# Patient Record
Sex: FEMALE | Race: BLACK OR AFRICAN AMERICAN | NOT HISPANIC OR LATINO | URBAN - METROPOLITAN AREA
[De-identification: names, ages, dates, MRNs, and addresses within clinical notes are randomized per-mention and may not be internally consistent; named-entity substitution may affect disease eponyms.]

---

## 2023-08-18 LAB
CHLAMYDIA TRACH., AMPLIFIED: NEGATIVE
N. GONORRHEA, AMPLIFIED: NEGATIVE
TRICHOMONAS VAGINALIS: NEGATIVE

## 2024-04-10 ENCOUNTER — HOSPITAL ENCOUNTER (EMERGENCY)
Facility: HOSPITAL | Age: 24
Discharge: HOME | End: 2024-04-10
Attending: EMERGENCY MEDICINE
Payer: COMMERCIAL

## 2024-04-10 VITALS
SYSTOLIC BLOOD PRESSURE: 110 MMHG | TEMPERATURE: 97.9 F | WEIGHT: 121 LBS | HEIGHT: 61 IN | OXYGEN SATURATION: 100 % | BODY MASS INDEX: 22.84 KG/M2 | DIASTOLIC BLOOD PRESSURE: 75 MMHG | RESPIRATION RATE: 16 BRPM | HEART RATE: 74 BPM

## 2024-04-10 DIAGNOSIS — N39.0 UTI (URINARY TRACT INFECTION) WITH PYURIA: Primary | ICD-10-CM

## 2024-04-10 DIAGNOSIS — N30.90 CYSTITIS: ICD-10-CM

## 2024-04-10 LAB
ALBUMIN SERPL BCP-MCNC: 4.2 G/DL (ref 3.4–5)
ALP SERPL-CCNC: 57 U/L (ref 33–110)
ALT SERPL W P-5'-P-CCNC: 10 U/L (ref 7–45)
ANION GAP SERPL CALC-SCNC: 12 MMOL/L (ref 10–20)
APPEARANCE UR: ABNORMAL
AST SERPL W P-5'-P-CCNC: 18 U/L (ref 9–39)
BACTERIA #/AREA URNS AUTO: ABNORMAL /HPF
BASOPHILS # BLD AUTO: 0.02 X10*3/UL (ref 0–0.1)
BASOPHILS NFR BLD AUTO: 0.2 %
BILIRUB SERPL-MCNC: 0.4 MG/DL (ref 0–1.2)
BILIRUB UR STRIP.AUTO-MCNC: NEGATIVE MG/DL
BUN SERPL-MCNC: 11 MG/DL (ref 6–23)
CALCIUM SERPL-MCNC: 9.2 MG/DL (ref 8.6–10.3)
CHLORIDE SERPL-SCNC: 106 MMOL/L (ref 98–107)
CO2 SERPL-SCNC: 24 MMOL/L (ref 21–32)
COLOR UR: ABNORMAL
CREAT SERPL-MCNC: 0.77 MG/DL (ref 0.5–1.05)
EGFRCR SERPLBLD CKD-EPI 2021: >90 ML/MIN/1.73M*2
EOSINOPHIL # BLD AUTO: 0.07 X10*3/UL (ref 0–0.7)
EOSINOPHIL NFR BLD AUTO: 0.8 %
ERYTHROCYTE [DISTWIDTH] IN BLOOD BY AUTOMATED COUNT: 14.4 % (ref 11.5–14.5)
GLUCOSE SERPL-MCNC: 86 MG/DL (ref 74–99)
GLUCOSE UR STRIP.AUTO-MCNC: NORMAL MG/DL
HCG UR QL IA.RAPID: NEGATIVE
HCT VFR BLD AUTO: 39.1 % (ref 36–46)
HGB BLD-MCNC: 12.9 G/DL (ref 12–16)
IMM GRANULOCYTES # BLD AUTO: 0.03 X10*3/UL (ref 0–0.7)
IMM GRANULOCYTES NFR BLD AUTO: 0.3 % (ref 0–0.9)
KETONES UR STRIP.AUTO-MCNC: NEGATIVE MG/DL
LACTATE SERPL-SCNC: 0.5 MMOL/L (ref 0.4–2)
LEUKOCYTE ESTERASE UR QL STRIP.AUTO: ABNORMAL
LIPASE SERPL-CCNC: 48 U/L (ref 9–82)
LYMPHOCYTES # BLD AUTO: 2.45 X10*3/UL (ref 1.2–4.8)
LYMPHOCYTES NFR BLD AUTO: 26.3 %
MCH RBC QN AUTO: 29.3 PG (ref 26–34)
MCHC RBC AUTO-ENTMCNC: 33 G/DL (ref 32–36)
MCV RBC AUTO: 89 FL (ref 80–100)
MONOCYTES # BLD AUTO: 0.63 X10*3/UL (ref 0.1–1)
MONOCYTES NFR BLD AUTO: 6.8 %
MUCOUS THREADS #/AREA URNS AUTO: ABNORMAL /LPF
NEUTROPHILS # BLD AUTO: 6.11 X10*3/UL (ref 1.2–7.7)
NEUTROPHILS NFR BLD AUTO: 65.6 %
NITRITE UR QL STRIP.AUTO: ABNORMAL
NRBC BLD-RTO: 0 /100 WBCS (ref 0–0)
PH UR STRIP.AUTO: 7 [PH]
PLATELET # BLD AUTO: 251 X10*3/UL (ref 150–450)
POTASSIUM SERPL-SCNC: 4 MMOL/L (ref 3.5–5.3)
PROT SERPL-MCNC: 7.4 G/DL (ref 6.4–8.2)
PROT UR STRIP.AUTO-MCNC: ABNORMAL MG/DL
RBC # BLD AUTO: 4.4 X10*6/UL (ref 4–5.2)
RBC # UR STRIP.AUTO: ABNORMAL /UL
RBC #/AREA URNS AUTO: >20 /HPF
SODIUM SERPL-SCNC: 138 MMOL/L (ref 136–145)
SP GR UR STRIP.AUTO: 1.02
UROBILINOGEN UR STRIP.AUTO-MCNC: ABNORMAL MG/DL
WBC # BLD AUTO: 9.3 X10*3/UL (ref 4.4–11.3)
WBC #/AREA URNS AUTO: >50 /HPF
WBC CLUMPS #/AREA URNS AUTO: ABNORMAL /HPF

## 2024-04-10 PROCEDURE — 83605 ASSAY OF LACTIC ACID: CPT | Performed by: EMERGENCY MEDICINE

## 2024-04-10 PROCEDURE — 85025 COMPLETE CBC W/AUTO DIFF WBC: CPT | Performed by: NURSE PRACTITIONER

## 2024-04-10 PROCEDURE — 84075 ASSAY ALKALINE PHOSPHATASE: CPT | Performed by: NURSE PRACTITIONER

## 2024-04-10 PROCEDURE — 87086 URINE CULTURE/COLONY COUNT: CPT | Mod: AHULAB | Performed by: EMERGENCY MEDICINE

## 2024-04-10 PROCEDURE — 83690 ASSAY OF LIPASE: CPT | Performed by: NURSE PRACTITIONER

## 2024-04-10 PROCEDURE — 81025 URINE PREGNANCY TEST: CPT | Performed by: EMERGENCY MEDICINE

## 2024-04-10 PROCEDURE — 36415 COLL VENOUS BLD VENIPUNCTURE: CPT | Performed by: EMERGENCY MEDICINE

## 2024-04-10 PROCEDURE — 81001 URINALYSIS AUTO W/SCOPE: CPT | Performed by: EMERGENCY MEDICINE

## 2024-04-10 PROCEDURE — 87800 DETECT AGNT MULT DNA DIREC: CPT | Performed by: EMERGENCY MEDICINE

## 2024-04-10 PROCEDURE — 36415 COLL VENOUS BLD VENIPUNCTURE: CPT | Performed by: NURSE PRACTITIONER

## 2024-04-10 PROCEDURE — 99283 EMERGENCY DEPT VISIT LOW MDM: CPT | Performed by: EMERGENCY MEDICINE

## 2024-04-10 PROCEDURE — 2500000001 HC RX 250 WO HCPCS SELF ADMINISTERED DRUGS (ALT 637 FOR MEDICARE OP): Performed by: EMERGENCY MEDICINE

## 2024-04-10 RX ORDER — ACETAMINOPHEN 325 MG/1
650 TABLET ORAL ONCE
Status: COMPLETED | OUTPATIENT
Start: 2024-04-10 | End: 2024-04-10

## 2024-04-10 RX ORDER — CEPHALEXIN 500 MG/1
500 CAPSULE ORAL 4 TIMES DAILY
Qty: 40 CAPSULE | Refills: 0 | Status: SHIPPED | OUTPATIENT
Start: 2024-04-10 | End: 2024-04-20

## 2024-04-10 RX ORDER — PHENAZOPYRIDINE HYDROCHLORIDE 200 MG/1
200 TABLET, FILM COATED ORAL 3 TIMES DAILY
Qty: 6 TABLET | Refills: 0 | Status: SHIPPED | OUTPATIENT
Start: 2024-04-10 | End: 2024-04-12

## 2024-04-10 RX ORDER — CEPHALEXIN 500 MG/1
500 CAPSULE ORAL ONCE
Status: COMPLETED | OUTPATIENT
Start: 2024-04-10 | End: 2024-04-10

## 2024-04-10 RX ORDER — PHENAZOPYRIDINE HYDROCHLORIDE 100 MG/1
95 TABLET, FILM COATED ORAL
Status: DISCONTINUED | OUTPATIENT
Start: 2024-04-10 | End: 2024-04-10 | Stop reason: HOSPADM

## 2024-04-10 RX ADMIN — PHENAZOPYRIDINE 100 MG: 100 TABLET ORAL at 16:57

## 2024-04-10 RX ADMIN — CEPHALEXIN 500 MG: 500 CAPSULE ORAL at 16:57

## 2024-04-10 RX ADMIN — ACETAMINOPHEN 650 MG: 325 TABLET ORAL at 13:00

## 2024-04-10 ASSESSMENT — COLUMBIA-SUICIDE SEVERITY RATING SCALE - C-SSRS
2. HAVE YOU ACTUALLY HAD ANY THOUGHTS OF KILLING YOURSELF?: NO
6. HAVE YOU EVER DONE ANYTHING, STARTED TO DO ANYTHING, OR PREPARED TO DO ANYTHING TO END YOUR LIFE?: NO
1. IN THE PAST MONTH, HAVE YOU WISHED YOU WERE DEAD OR WISHED YOU COULD GO TO SLEEP AND NOT WAKE UP?: NO

## 2024-04-10 ASSESSMENT — PAIN DESCRIPTION - LOCATION: LOCATION: ABDOMEN

## 2024-04-10 ASSESSMENT — PAIN DESCRIPTION - PROGRESSION: CLINICAL_PROGRESSION: NOT CHANGED

## 2024-04-10 ASSESSMENT — PAIN SCALES - GENERAL
PAINLEVEL_OUTOF10: 8
PAINLEVEL_OUTOF10: 8
PAINLEVEL_OUTOF10: 7

## 2024-04-10 ASSESSMENT — PAIN - FUNCTIONAL ASSESSMENT
PAIN_FUNCTIONAL_ASSESSMENT: 0-10
PAIN_FUNCTIONAL_ASSESSMENT: 0-10

## 2024-04-10 NOTE — ED TRIAGE NOTES
PT TO ED FROM HOME WITH C/O PELVIC AND ABD PAIN WITH DYSURIA FOR 1 DAY. PT ADMITS TO BEING SEXUALLY ACTIVE BUT DENIES CONCERN FOR STI'S

## 2024-04-10 NOTE — ED TRIAGE NOTES
TRIAGE NOTE   I saw the patient as the Clinician in Triage and performed a brief history and physical exam, established acuity, and ordered appropriate tests to develop basic plan of care. Patient will be seen by an [facility], resident and/or physician who will independently evaluate the patient. Please see subsequent provider notes for further details and disposition.     Brief HPI: In brief, Martha Maria is a 23 y.o. female that presents for dysuria x 1 day.     Focused Physical exam:   Abd TTP suprapubic no CVAT     Plan/MDM:   UA, urine hcg, urine gc     Please see subsequent provider note for further details and disposition

## 2024-04-10 NOTE — ED PROVIDER NOTES
HPI   Chief Complaint   Patient presents with    Abdominal Pain    Pelvic Pain       HPI  Patient is a previously 23-year-old female who presented to the emergency room with urinary symptoms.  Patient states that earlier today she developed some suprapubic discomfort, dysuria and hematuria.  She denies any vaginal discharge, foul odor or concerns for STI.  She had her period last 1 week ago and it was normal in length.  Denies any flank pain, fever, chills, nausea, vomiting or other symptoms.  She has never experienced a UTI in the past.      PMHx: As above  PSHx: Denies pertinent  FamilyHx: Denies pertinent  SocialHx: Denies NKDA  Allergies:  Medications: See Medication Reconciliation     ROS  As above but otherwise denies    Physical Exam    GENERAL: Awake and Alert, No Acute Distress  HEENT: AT/NC, PERRL, EOMI, Normal Oropharynx, No Signs of Dehydration  NECK: Normal Inspection, No JVD  CARDIOVASCULAR: RRR, No M/R/G  RESPIRATORY: CTA Bilaterally, No Wheezes, Rales or Rhonchi, Chest Wall Non-tender  ABDOMEN: Suprapubic discomfort and tenderness to palpation but otherwise soft, non-tender abdomen, Normal Bowel Sounds, No Distention  BACK: No CVA Tenderness  SKIN: Normal Color, Warm, Dry, No Rashes   EXTREMITIES: Non-Tender, Full ROM, No Pedal Edema  NEURO: A&O x 3, Normal Motor and Sensation, Normal Mood and Affect    Nursing Assessment and Vitals Reviewed    Medical Decision  Patient is a previously 23-year-old female who presented to the emergency room with urinary symptoms.  Patient states that earlier today she developed some suprapubic discomfort, dysuria and hematuria.  She denies any vaginal discharge, foul odor or concerns for STI.  She had her period last 1 week ago and it was normal in length.  Denies any flank pain, fever, chills, nausea, vomiting or other symptoms.  She has never experienced a UTI in the past.    Patient is seen in the hallway owing to large patient volumes.  She was seen by a provider  in triage and workup was performed prior to my evaluation.  On my evaluation she is well-appearing and in no acute distress.  Lungs are clear and heart is regular in rhythm.  Abdomen is tender to palpation only in the suprapubic region.  She has no CVA tenderness bilaterally.  She is afebrile.  Lab work largely within normal limits with the exception of urinalysis that showed findings concerning for infection.  She is negative pregnancy.  She is ordered Pyridium, Keflex and will be discharged home with a prescription for both due to concern for UTI and cystitis.  She is discharged in stable condition with instructions to follow-up with her primary care physician and education on signs and symptoms that should prompt immediate turn to emergency room.                            Amy Coma Scale Score: 15                     Patient History   Past Medical History:   Diagnosis Date    Chlamydial infection, unspecified 02/16/2021    Chlamydia infection    Encounter for surveillance of contraceptive pills 08/10/2022    Encounter for surveillance of contraceptive pills     No past surgical history on file.  No family history on file.  Social History     Tobacco Use    Smoking status: Not on file    Smokeless tobacco: Not on file   Substance Use Topics    Alcohol use: Not on file    Drug use: Not on file       Physical Exam   ED Triage Vitals   Temperature Heart Rate Respirations BP   04/10/24 1040 04/10/24 1040 04/10/24 1040 04/10/24 1040   37.1 °C (98.7 °F) 77 15 117/73      Pulse Ox Temp src Heart Rate Source Patient Position   04/10/24 1040 -- 04/10/24 1325 --   99 %  Monitor       BP Location FiO2 (%)     04/10/24 1325 --     Right arm        Physical Exam    ED Course & MDM   Diagnoses as of 04/10/24 1649   UTI (urinary tract infection) with pyuria   Cystitis       Medical Decision Making      Procedure  Procedures     Ivone Forman MD  04/10/24 1645

## 2024-04-10 NOTE — DISCHARGE INSTRUCTIONS
Please take medications as indicated for length of time instructed.  Follow-up with your primary care doctor or gynecology.  Return immediately if concerning symptoms, as discussed.

## 2024-04-11 LAB
C TRACH RRNA SPEC QL NAA+PROBE: NEGATIVE
HOLD SPECIMEN: NORMAL
N GONORRHOEA DNA SPEC QL PROBE+SIG AMP: NEGATIVE

## 2024-04-13 LAB — BACTERIA UR CULT: ABNORMAL

## 2024-04-14 ENCOUNTER — TELEPHONE (OUTPATIENT)
Dept: PHARMACY | Facility: HOSPITAL | Age: 24
End: 2024-04-14
Payer: COMMERCIAL

## 2024-04-14 NOTE — PROGRESS NOTES
EDPD Note: Dose and Duration Change    Contacted Martha Maria regarding positive urine culture that was taken during their recent emergency room visit. I completed education with  patient. Patient was prescribed Keflex 500 mg QID x 10 days. She was having symptoms of suprapubic discomfort, dysuria and hematuria which she states has since been resolving since taking the medication. The patient is being treated with the proper medication; however, the dose of the discharge prescription is incorrect. The duration of the discharge prescription is also incorrect.  I gave verbal education about the new medication dose found below. No further follow up needed from EDPD Team.     Drug: Keflex 500 mg  Sig: Take one capsule mouth twice daily for 7 days  Days Supply: 7    If there are any other questions for the ED Post-Discharge Culture Follow Up Team, please contact [phone]. Fax: [phone].    Glenny Merrill, ShahnazD

## 2024-04-22 ENCOUNTER — APPOINTMENT (OUTPATIENT)
Dept: OBSTETRICS AND GYNECOLOGY | Facility: CLINIC | Age: 24
End: 2024-04-22
Payer: COMMERCIAL

## 2024-04-23 ENCOUNTER — OFFICE VISIT (OUTPATIENT)
Dept: OBSTETRICS AND GYNECOLOGY | Facility: CLINIC | Age: 24
End: 2024-04-23
Payer: COMMERCIAL

## 2024-04-23 VITALS
SYSTOLIC BLOOD PRESSURE: 115 MMHG | BODY MASS INDEX: 23.49 KG/M2 | WEIGHT: 124.4 LBS | HEIGHT: 61 IN | DIASTOLIC BLOOD PRESSURE: 74 MMHG

## 2024-04-23 DIAGNOSIS — Z11.3 SCREENING EXAMINATION FOR STI: ICD-10-CM

## 2024-04-23 DIAGNOSIS — Z30.44 ENCOUNTER FOR SURVEILLANCE OF VAGINAL RING HORMONAL CONTRACEPTIVE DEVICE: ICD-10-CM

## 2024-04-23 DIAGNOSIS — N39.0 URINARY TRACT INFECTION WITHOUT HEMATURIA, SITE UNSPECIFIED: Primary | ICD-10-CM

## 2024-04-23 PROCEDURE — 99214 OFFICE O/P EST MOD 30 MIN: CPT | Performed by: STUDENT IN AN ORGANIZED HEALTH CARE EDUCATION/TRAINING PROGRAM

## 2024-04-23 PROCEDURE — 87205 SMEAR GRAM STAIN: CPT

## 2024-04-23 PROCEDURE — 87661 TRICHOMONAS VAGINALIS AMPLIF: CPT

## 2024-04-23 PROCEDURE — 87591 N.GONORRHOEAE DNA AMP PROB: CPT

## 2024-04-23 PROCEDURE — 87800 DETECT AGNT MULT DNA DIREC: CPT

## 2024-04-23 PROCEDURE — 1036F TOBACCO NON-USER: CPT | Performed by: STUDENT IN AN ORGANIZED HEALTH CARE EDUCATION/TRAINING PROGRAM

## 2024-04-23 PROCEDURE — 87491 CHLMYD TRACH DNA AMP PROBE: CPT

## 2024-04-23 ASSESSMENT — ENCOUNTER SYMPTOMS
ALLERGIC/IMMUNOLOGIC NEGATIVE: 0
ENDOCRINE NEGATIVE: 0
NEUROLOGICAL NEGATIVE: 0
PSYCHIATRIC NEGATIVE: 0
EYES NEGATIVE: 0
CONSTITUTIONAL NEGATIVE: 0
HEMATOLOGIC/LYMPHATIC NEGATIVE: 0
CARDIOVASCULAR NEGATIVE: 0
GASTROINTESTINAL NEGATIVE: 0
MUSCULOSKELETAL NEGATIVE: 0
RESPIRATORY NEGATIVE: 0

## 2024-04-23 ASSESSMENT — PAIN SCALES - GENERAL: PAINLEVEL: 0-NO PAIN

## 2024-04-23 NOTE — PROGRESS NOTES
Subjective   Patient ID: Martha Maria is a 23 y.o. female who presents for Gynecologic Exam (Pt here for Gyn Visit. Last Pap 8/10/22 wnl. ).  Patient here for UTI follow-up.  Patient had been prescribed a course of Keflex.  After taking 7 days of the Keflex her symptoms resolved but have now returned.  Additionally she is using the vaginal ring for contraception and would like to become amenorrheic.    Gynecologic Exam        Review of Systems   All other systems reviewed and are negative.      Objective   Physical Exam  Vitals reviewed. Exam conducted with a chaperone present.   Constitutional:       General: She is not in acute distress.     Appearance: She is not ill-appearing.   Pulmonary:      Effort: Pulmonary effort is normal.   Genitourinary:     General: Normal vulva.      Exam position: Lithotomy position.      Vagina: No signs of injury and foreign body. No vaginal discharge (Thick mucoid discharge throughout the vaginal fornices), erythema, tenderness, bleeding, lesions or prolapsed vaginal walls.      Cervix: Discharge and erythema present. No friability, lesion or cervical bleeding.   Neurological:      Mental Status: She is alert.         Assessment/Plan   Diagnoses and all orders for this visit:  Urinary tract infection without hematuria, site unspecified  Screening examination for STI  -     C. Trachomatis / N. Gonorrhoeae, Amplified Detection  -     Trichomonas vaginalis, Nucleic Acid Detection  -     Vaginitis Gram Stain For Bacterial Vaginosis + Yeast  Encounter for surveillance of vaginal ring hormonal contraceptive device    Patient here for UTI follow-up.  Here in the office is negative.  Suspect that patient's new dysuria is actually related to vaginitis.  Speculum exam is consistent with vaginal infection the specific etiology is unclear.  Will follow-up STI labs and vaginitis Gram stain.  Counseled patient on how to use the vaginal ring to achieve amenorrhea.  Patient to follow-up in 3  months.       Abdirashid Menon MD 04/23/24 6:07 PM

## 2024-04-24 LAB
C TRACH RRNA SPEC QL NAA+PROBE: NEGATIVE
CLUE CELLS VAG LPF-#/AREA: NORMAL /[LPF]
N GONORRHOEA DNA SPEC QL PROBE+SIG AMP: NEGATIVE
NUGENT SCORE: 0
T VAGINALIS RRNA SPEC QL NAA+PROBE: NEGATIVE
YEAST VAG WET PREP-#/AREA: NORMAL

## 2024-07-16 ENCOUNTER — APPOINTMENT (OUTPATIENT)
Dept: OBSTETRICS AND GYNECOLOGY | Facility: CLINIC | Age: 24
End: 2024-07-16
Payer: COMMERCIAL

## 2024-07-24 ENCOUNTER — APPOINTMENT (OUTPATIENT)
Dept: OBSTETRICS AND GYNECOLOGY | Facility: CLINIC | Age: 24
End: 2024-07-24
Payer: COMMERCIAL

## 2024-07-25 ENCOUNTER — APPOINTMENT (OUTPATIENT)
Dept: OBSTETRICS AND GYNECOLOGY | Facility: CLINIC | Age: 24
End: 2024-07-25
Payer: COMMERCIAL

## 2024-08-05 ENCOUNTER — APPOINTMENT (OUTPATIENT)
Dept: OBSTETRICS AND GYNECOLOGY | Facility: CLINIC | Age: 24
End: 2024-08-05
Payer: COMMERCIAL

## 2024-08-05 VITALS — SYSTOLIC BLOOD PRESSURE: 111 MMHG | DIASTOLIC BLOOD PRESSURE: 68 MMHG | HEART RATE: 73 BPM

## 2024-08-05 DIAGNOSIS — Z30.44 ENCOUNTER FOR SURVEILLANCE OF VAGINAL RING HORMONAL CONTRACEPTIVE DEVICE: ICD-10-CM

## 2024-08-05 DIAGNOSIS — R39.9 SYMPTOMS INVOLVING URINARY SYSTEM: Primary | ICD-10-CM

## 2024-08-05 DIAGNOSIS — Z01.419 WELL WOMAN EXAM WITH ROUTINE GYNECOLOGICAL EXAM: ICD-10-CM

## 2024-08-05 PROCEDURE — 1036F TOBACCO NON-USER: CPT | Performed by: NURSE PRACTITIONER

## 2024-08-05 PROCEDURE — 99395 PREV VISIT EST AGE 18-39: CPT | Performed by: NURSE PRACTITIONER

## 2024-08-05 PROCEDURE — 87086 URINE CULTURE/COLONY COUNT: CPT

## 2024-08-05 RX ORDER — SEGESTERONE ACETATE AND ETHINYL ESTRADIOL 103; 17.4 MG/1; MG/1
1 RING VAGINAL
COMMUNITY
End: 2024-08-05 | Stop reason: SDUPTHER

## 2024-08-05 RX ORDER — SEGESTERONE ACETATE AND ETHINYL ESTRADIOL 103; 17.4 MG/1; MG/1
1 RING VAGINAL
Qty: 1 EACH | Refills: 0 | Status: SHIPPED | OUTPATIENT
Start: 2024-08-05

## 2024-08-05 RX ORDER — LORATADINE 10 MG/1
1 TABLET ORAL DAILY
COMMUNITY
Start: 2016-09-19

## 2024-08-05 ASSESSMENT — ENCOUNTER SYMPTOMS
CARDIOVASCULAR NEGATIVE: 1
EYES NEGATIVE: 1
PSYCHIATRIC NEGATIVE: 1
CONSTITUTIONAL NEGATIVE: 1
NEUROLOGICAL NEGATIVE: 1
RESPIRATORY NEGATIVE: 1
GASTROINTESTINAL NEGATIVE: 1
ENDOCRINE NEGATIVE: 1
HEMATOLOGIC/LYMPHATIC NEGATIVE: 1
MUSCULOSKELETAL NEGATIVE: 1
ALLERGIC/IMMUNOLOGIC NEGATIVE: 1

## 2024-08-05 NOTE — PROGRESS NOTES
Joie Estes, APRN-CNP     Subjective   Susannaindia Maria is a 24 y.o. female who presents for annual exam.   Up-to-date with Pap smear.  No changes in health history.  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  No past medical history on file.  No past surgical history on file.    OB History          0    Para   0    Term   0       0    AB   0    Living   0         SAB   0    IAB   0    Ectopic   0    Multiple   0    Live Births   0               Patient's last menstrual period was 2024 (exact date).      Review of Systems   Constitutional: Negative.    HENT: Negative.     Eyes: Negative.    Respiratory: Negative.     Cardiovascular: Negative.    Gastrointestinal: Negative.    Endocrine: Negative.    Genitourinary: Negative.    Musculoskeletal: Negative.    Skin: Negative.    Allergic/Immunologic: Negative.    Neurological: Negative.    Hematological: Negative.    Psychiatric/Behavioral: Negative.     All other systems reviewed and are negative.    Breast: No Complaints   Vaginal: No Complaints        Objective   /68   Pulse 73   LMP 2024 (Exact Date)   Physical Exam  Constitutional:       Appearance: Normal appearance. She is normal weight.   Genitourinary:      Vulva and rectum normal.      Right Labia: No rash.     Left Labia: No rash.     No vaginal discharge.      No vaginal prolapse present.     No vaginal atrophy present.       Right Adnexa: no mass present.     Left Adnexa: no mass present.     Cervix is nulliparous.      No cervical motion tenderness.   Breasts:     Right: Normal.      Left: Normal.   Cardiovascular:      Rate and Rhythm: Normal rate.      Heart sounds: Normal heart sounds.   Pulmonary:       Effort: Pulmonary effort is normal.      Breath sounds: Normal breath sounds.   Abdominal:      Palpations: Abdomen is soft.   Musculoskeletal:         General: Normal range of motion.      Cervical back: Normal range of motion.   Neurological:      General: No focal deficit present.      Mental Status: She is alert.   Skin:     General: Skin is warm and dry.   Vitals and nursing note reviewed.                   Assessment/Plan   Problem List Items Addressed This Visit    None  Visit Diagnoses         Codes    Symptoms involving urinary system    -  Primary R39.9    Relevant Orders    Urine Culture    Well woman exam with routine gynecological exam     Z01.419    Encounter for surveillance of vaginal ring hormonal contraceptive device     Z30.44    Relevant Medications    Annovera 0.15-0.013 mg/24 hour ring

## 2024-08-07 LAB — BACTERIA UR CULT: NORMAL
